# Patient Record
Sex: MALE | Race: WHITE | NOT HISPANIC OR LATINO | URBAN - METROPOLITAN AREA
[De-identification: names, ages, dates, MRNs, and addresses within clinical notes are randomized per-mention and may not be internally consistent; named-entity substitution may affect disease eponyms.]

---

## 2020-01-08 ENCOUNTER — HOSPITAL ENCOUNTER (EMERGENCY)
Facility: HOSPITAL | Age: 45
Discharge: HOME | End: 2020-01-08
Attending: EMERGENCY MEDICINE
Payer: COMMERCIAL

## 2020-01-08 VITALS
WEIGHT: 235 LBS | HEART RATE: 83 BPM | OXYGEN SATURATION: 97 % | HEIGHT: 70 IN | TEMPERATURE: 98 F | DIASTOLIC BLOOD PRESSURE: 76 MMHG | SYSTOLIC BLOOD PRESSURE: 134 MMHG | BODY MASS INDEX: 33.64 KG/M2 | RESPIRATION RATE: 19 BRPM

## 2020-01-08 DIAGNOSIS — S60.222A CONTUSION OF LEFT HAND, INITIAL ENCOUNTER: Primary | ICD-10-CM

## 2020-01-08 PROCEDURE — 63600175 PHARM REV CODE 636 W HCPCS: Mod: ER | Performed by: EMERGENCY MEDICINE

## 2020-01-08 PROCEDURE — 90715 TDAP VACCINE 7 YRS/> IM: CPT | Mod: ER | Performed by: EMERGENCY MEDICINE

## 2020-01-08 PROCEDURE — 90471 IMMUNIZATION ADMIN: CPT | Mod: ER | Performed by: EMERGENCY MEDICINE

## 2020-01-08 PROCEDURE — 25000003 PHARM REV CODE 250: Mod: ER | Performed by: EMERGENCY MEDICINE

## 2020-01-08 PROCEDURE — 99283 EMERGENCY DEPT VISIT LOW MDM: CPT | Mod: 25,ER

## 2020-01-08 RX ORDER — ACETAMINOPHEN 500 MG
1000 TABLET ORAL EVERY 6 HOURS PRN
Qty: 30 TABLET | Refills: 0 | Status: SHIPPED | OUTPATIENT
Start: 2020-01-08

## 2020-01-08 RX ORDER — IBUPROFEN 600 MG/1
600 TABLET ORAL EVERY 6 HOURS PRN
Qty: 20 TABLET | Refills: 0 | Status: SHIPPED | OUTPATIENT
Start: 2020-01-08

## 2020-01-08 RX ORDER — IBUPROFEN 600 MG/1
600 TABLET ORAL
Status: COMPLETED | OUTPATIENT
Start: 2020-01-08 | End: 2020-01-08

## 2020-01-08 RX ORDER — DICLOFENAC SODIUM 10 MG/G
2 GEL TOPICAL 4 TIMES DAILY PRN
Qty: 1 TUBE | Refills: 0 | Status: SHIPPED | OUTPATIENT
Start: 2020-01-08 | End: 2020-01-18

## 2020-01-08 RX ADMIN — IBUPROFEN 600 MG: 600 TABLET ORAL at 10:01

## 2020-01-08 RX ADMIN — CLOSTRIDIUM TETANI TOXOID ANTIGEN (FORMALDEHYDE INACTIVATED), CORYNEBACTERIUM DIPHTHERIAE TOXOID ANTIGEN (FORMALDEHYDE INACTIVATED), BORDETELLA PERTUSSIS TOXOID ANTIGEN (GLUTARALDEHYDE INACTIVATED), BORDETELLA PERTUSSIS FILAMENTOUS HEMAGGLUTININ ANTIGEN (FORMALDEHYDE INACTIVATED), BORDETELLA PERTUSSIS PERTACTIN ANTIGEN, AND BORDETELLA PERTUSSIS FIMBRIAE 2/3 ANTIGEN 0.5 ML: 5; 2; 2.5; 5; 3; 5 INJECTION, SUSPENSION INTRAMUSCULAR at 10:01

## 2020-01-08 NOTE — ED PROVIDER NOTES
Encounter Date: 1/8/2020       History     Chief Complaint   Patient presents with    Hand Injury     left hand injury, struck hand with hammer yesterday, iced over night, swollen and painful     44-year-old male chief complaint left hand pain and swelling. Pain is worse at the base of the index finger.  Patient accidentally hit his left hand with a hammer about 4:00 a.m. last night.  Patient states pain and swelling has improved since original injury occurred.  Patient states pain is worse with movement and pressure on the area.    The history is provided by the patient.   Hand Injury    The incident occurred yesterday. The injury mechanism was a direct blow. The quality of the pain is described as aching. The pain is at a severity of 6/10. The pain has been constant since the incident. Pertinent negatives include no fever and no malaise/fatigue. He reports no foreign bodies present. He has tried ice for the symptoms. The treatment provided mild relief.     Review of patient's allergies indicates:  No Known Allergies  History reviewed. No pertinent past medical history.  History reviewed. No pertinent surgical history.  History reviewed. No pertinent family history.  Social History     Tobacco Use    Smoking status: Never Smoker   Substance Use Topics    Alcohol use: Never     Frequency: Never    Drug use: Never     Review of Systems   Constitutional: Negative for fever and malaise/fatigue.   HENT: Negative for sore throat.    Respiratory: Negative for shortness of breath.    Cardiovascular: Negative for chest pain.   Gastrointestinal: Negative for nausea.   Genitourinary: Negative for dysuria.   Musculoskeletal: Positive for joint swelling. Negative for back pain.   Skin: Negative for rash.   Neurological: Negative for weakness.   Hematological: Does not bruise/bleed easily.   All other systems reviewed and are negative.      Physical Exam     Initial Vitals [01/08/20 0957]   BP Pulse Resp Temp SpO2   134/76  83 19 98 °F (36.7 °C) 97 %      MAP       --         Physical Exam    Nursing note and vitals reviewed.  Constitutional: He appears well-developed and well-nourished.   HENT:   Head: Normocephalic and atraumatic.   Right Ear: External ear normal.   Left Ear: External ear normal.   Nose: Nose normal.   Eyes: EOM are normal. Pupils are equal, round, and reactive to light.   Neck: Normal range of motion. Neck supple.   Cardiovascular: Normal rate, regular rhythm, normal heart sounds and intact distal pulses. Exam reveals no gallop and no friction rub.    No murmur heard.  Pulmonary/Chest: Breath sounds normal. No respiratory distress. He has no wheezes. He has no rhonchi. He has no rales.   Abdominal: Soft. Bowel sounds are normal. He exhibits no distension. There is no tenderness. There is no rebound and no guarding.   Musculoskeletal: He exhibits edema and tenderness.        Right shoulder: Normal. He exhibits normal range of motion, no tenderness and no bony tenderness.        Left shoulder: Normal. He exhibits normal range of motion, no tenderness and no bony tenderness.        Right elbow: Normal.He exhibits normal range of motion, no swelling and no effusion.        Left elbow: Normal. He exhibits normal range of motion, no swelling and no effusion.        Right wrist: Normal. He exhibits normal range of motion, no tenderness and no bony tenderness.        Left wrist: Normal. He exhibits normal range of motion, no tenderness and no bony tenderness.        Right hand: Normal. He exhibits normal range of motion, no tenderness, normal capillary refill and no deformity.        Left hand: He exhibits decreased range of motion, tenderness, bony tenderness and swelling. He exhibits normal capillary refill and no laceration. Normal sensation noted. Normal strength noted.        Hands:  Neurological: He is alert and oriented to person, place, and time. He has normal strength. No cranial nerve deficit.   Skin: Skin is warm  and dry. Capillary refill takes less than 2 seconds. No erythema.   Psychiatric: He has a normal mood and affect.         ED Course   Procedures         Imaging Results          X-Ray Hand 3 view Left (Final result)  Result time 01/08/20 10:44:03    Final result by Elliot Joseph MD (01/08/20 10:44:03)                 Impression:      Soft tissue swelling and radiodense foreign body as described above      Electronically signed by: Elliot Tellez  Date:    01/08/2020  Time:    10:44             Narrative:    EXAMINATION:  XR HAND COMPLETE 3 VIEW LEFT    CLINICAL HISTORY:  Hit left hand with hammer now painful and swollen;    TECHNIQUE:  XR HAND COMPLETE 3 VIEW LEFT    COMPARISON:  None    FINDINGS:  There is diffuse soft tissue swelling of the hand most notably the hypo thenar eminence.  There is a 3.3 mm radiodense foreign body within the palm are aspect of the soft tissues the hypothenar eminence, at the level of the 5th metacarpal shaft.  There is no evidence of fracture.                                                           Medical decision making   Chief complaint:  Hand pain  Differential diagnosis:  Strain, sprain, contusion, and fracture  Treatment in the ED Physical Exam, ibuprofen, tetanus updated.  Ace wrap and sling for comfort  Patient reports feeling better after medication.    Discussed labs, and imaging results.    Fill and take prescriptions as directed.  Return to the ED if symptoms worsen or do not resolve.   Answered questions and discussed discharge plan.    Patient feels better and is ready for discharge.  Follow up with PCP/specialist in 1 day.        Clinical Impression:       ICD-10-CM ICD-9-CM   1. Contusion of left hand, initial encounter S60.222A 923.20                             Mariluz Rutherford DO  01/08/20 1318